# Patient Record
Sex: MALE | Race: WHITE | Employment: FULL TIME | URBAN - METROPOLITAN AREA
[De-identification: names, ages, dates, MRNs, and addresses within clinical notes are randomized per-mention and may not be internally consistent; named-entity substitution may affect disease eponyms.]

---

## 2021-08-12 ENCOUNTER — HOSPITAL ENCOUNTER (INPATIENT)
Age: 66
LOS: 3 days | Discharge: HOME | DRG: 871 | End: 2021-08-15
Attending: EMERGENCY MEDICINE | Admitting: INTERNAL MEDICINE
Payer: COMMERCIAL

## 2021-08-12 ENCOUNTER — APPOINTMENT (OUTPATIENT)
Dept: GENERAL RADIOLOGY | Age: 66
DRG: 871 | End: 2021-08-12
Attending: EMERGENCY MEDICINE
Payer: COMMERCIAL

## 2021-08-12 DIAGNOSIS — U07.1 PNEUMONIA DUE TO COVID-19 VIRUS: Primary | ICD-10-CM

## 2021-08-12 DIAGNOSIS — J12.82 PNEUMONIA DUE TO COVID-19 VIRUS: Primary | ICD-10-CM

## 2021-08-12 LAB
ALBUMIN SERPL-MCNC: 3.5 G/DL (ref 3.5–5)
ALBUMIN/GLOB SERPL: 0.7 {RATIO} (ref 1.1–2.2)
ALP SERPL-CCNC: 62 U/L (ref 45–117)
ALT SERPL-CCNC: 54 U/L (ref 12–78)
ANION GAP SERPL CALC-SCNC: 7 MMOL/L (ref 5–15)
APPEARANCE UR: ABNORMAL
AST SERPL-CCNC: 48 U/L (ref 15–37)
BACTERIA URNS QL MICRO: NEGATIVE /HPF
BASOPHILS # BLD: 0 K/UL (ref 0–0.1)
BASOPHILS NFR BLD: 0 % (ref 0–1)
BILIRUB SERPL-MCNC: 1 MG/DL (ref 0.2–1)
BILIRUB UR QL: NEGATIVE
BUN SERPL-MCNC: 11 MG/DL (ref 6–20)
BUN/CREAT SERPL: 9 (ref 12–20)
CALCIUM SERPL-MCNC: 9 MG/DL (ref 8.5–10.1)
CHLORIDE SERPL-SCNC: 99 MMOL/L (ref 97–108)
CO2 SERPL-SCNC: 28 MMOL/L (ref 21–32)
COLOR UR: ABNORMAL
CREAT SERPL-MCNC: 1.22 MG/DL (ref 0.7–1.3)
D DIMER PPP FEU-MCNC: 0.36 MG/L FEU (ref 0–0.65)
DIFFERENTIAL METHOD BLD: ABNORMAL
EOSINOPHIL # BLD: 0 K/UL (ref 0–0.4)
EOSINOPHIL NFR BLD: 0 % (ref 0–7)
EPITH CASTS URNS QL MICRO: ABNORMAL /LPF
ERYTHROCYTE [DISTWIDTH] IN BLOOD BY AUTOMATED COUNT: 13.6 % (ref 11.5–14.5)
GLOBULIN SER CALC-MCNC: 5.3 G/DL (ref 2–4)
GLUCOSE SERPL-MCNC: 138 MG/DL (ref 65–100)
GLUCOSE UR STRIP.AUTO-MCNC: NEGATIVE MG/DL
HCT VFR BLD AUTO: 52 % (ref 36.6–50.3)
HGB BLD-MCNC: 17.2 G/DL (ref 12.1–17)
HGB UR QL STRIP: NEGATIVE
HYALINE CASTS URNS QL MICRO: ABNORMAL /LPF (ref 0–5)
IMM GRANULOCYTES # BLD AUTO: 0 K/UL (ref 0–0.04)
IMM GRANULOCYTES NFR BLD AUTO: 0 % (ref 0–0.5)
KETONES UR QL STRIP.AUTO: 40 MG/DL
LEUKOCYTE ESTERASE UR QL STRIP.AUTO: NEGATIVE
LYMPHOCYTES # BLD: 0.7 K/UL (ref 0.8–3.5)
LYMPHOCYTES NFR BLD: 11 % (ref 12–49)
MCH RBC QN AUTO: 29.4 PG (ref 26–34)
MCHC RBC AUTO-ENTMCNC: 33.1 G/DL (ref 30–36.5)
MCV RBC AUTO: 88.9 FL (ref 80–99)
MONOCYTES # BLD: 0.2 K/UL (ref 0–1)
MONOCYTES NFR BLD: 4 % (ref 5–13)
NEUTS SEG # BLD: 5.3 K/UL (ref 1.8–8)
NEUTS SEG NFR BLD: 85 % (ref 32–75)
NITRITE UR QL STRIP.AUTO: NEGATIVE
NRBC # BLD: 0 K/UL (ref 0–0.01)
NRBC BLD-RTO: 0 PER 100 WBC
PH UR STRIP: 6 [PH] (ref 5–8)
PLATELET # BLD AUTO: 186 K/UL (ref 150–400)
PLATELET COMMENTS,PCOM: ABNORMAL
PMV BLD AUTO: 9.9 FL (ref 8.9–12.9)
POTASSIUM SERPL-SCNC: 3.3 MMOL/L (ref 3.5–5.1)
PROT SERPL-MCNC: 8.8 G/DL (ref 6.4–8.2)
PROT UR STRIP-MCNC: 30 MG/DL
RBC # BLD AUTO: 5.85 M/UL (ref 4.1–5.7)
RBC #/AREA URNS HPF: ABNORMAL /HPF (ref 0–5)
RBC MORPH BLD: ABNORMAL
SODIUM SERPL-SCNC: 134 MMOL/L (ref 136–145)
SP GR UR REFRACTOMETRY: 1.03 (ref 1–1.03)
UA: UC IF INDICATED,UAUC: ABNORMAL
UROBILINOGEN UR QL STRIP.AUTO: 0.2 EU/DL (ref 0.2–1)
WBC # BLD AUTO: 6.2 K/UL (ref 4.1–11.1)
WBC MORPH BLD: ABNORMAL
WBC URNS QL MICRO: ABNORMAL /HPF (ref 0–4)

## 2021-08-12 PROCEDURE — 74011250636 HC RX REV CODE- 250/636: Performed by: INTERNAL MEDICINE

## 2021-08-12 PROCEDURE — 65660000000 HC RM CCU STEPDOWN

## 2021-08-12 PROCEDURE — 80053 COMPREHEN METABOLIC PANEL: CPT

## 2021-08-12 PROCEDURE — 85025 COMPLETE CBC W/AUTO DIFF WBC: CPT

## 2021-08-12 PROCEDURE — 36415 COLL VENOUS BLD VENIPUNCTURE: CPT

## 2021-08-12 PROCEDURE — 71045 X-RAY EXAM CHEST 1 VIEW: CPT

## 2021-08-12 PROCEDURE — 99285 EMERGENCY DEPT VISIT HI MDM: CPT

## 2021-08-12 PROCEDURE — 74011000250 HC RX REV CODE- 250: Performed by: INTERNAL MEDICINE

## 2021-08-12 PROCEDURE — 74011250637 HC RX REV CODE- 250/637: Performed by: EMERGENCY MEDICINE

## 2021-08-12 PROCEDURE — 94640 AIRWAY INHALATION TREATMENT: CPT

## 2021-08-12 PROCEDURE — 74011250637 HC RX REV CODE- 250/637: Performed by: INTERNAL MEDICINE

## 2021-08-12 PROCEDURE — XW033E5 INTRODUCTION OF REMDESIVIR ANTI-INFECTIVE INTO PERIPHERAL VEIN, PERCUTANEOUS APPROACH, NEW TECHNOLOGY GROUP 5: ICD-10-PCS | Performed by: INTERNAL MEDICINE

## 2021-08-12 PROCEDURE — 74011250636 HC RX REV CODE- 250/636: Performed by: EMERGENCY MEDICINE

## 2021-08-12 PROCEDURE — 85379 FIBRIN DEGRADATION QUANT: CPT

## 2021-08-12 PROCEDURE — 81001 URINALYSIS AUTO W/SCOPE: CPT

## 2021-08-12 PROCEDURE — 96374 THER/PROPH/DIAG INJ IV PUSH: CPT

## 2021-08-12 RX ORDER — ONDANSETRON 4 MG/1
4 TABLET, ORALLY DISINTEGRATING ORAL
Status: DISCONTINUED | OUTPATIENT
Start: 2021-08-12 | End: 2021-08-15 | Stop reason: HOSPADM

## 2021-08-12 RX ORDER — DEXAMETHASONE SODIUM PHOSPHATE 4 MG/ML
10 INJECTION, SOLUTION INTRA-ARTICULAR; INTRALESIONAL; INTRAMUSCULAR; INTRAVENOUS; SOFT TISSUE ONCE
Status: COMPLETED | OUTPATIENT
Start: 2021-08-12 | End: 2021-08-12

## 2021-08-12 RX ORDER — ENOXAPARIN SODIUM 100 MG/ML
40 INJECTION SUBCUTANEOUS DAILY
Status: DISCONTINUED | OUTPATIENT
Start: 2021-08-13 | End: 2021-08-15 | Stop reason: HOSPADM

## 2021-08-12 RX ORDER — ALBUTEROL SULFATE 90 UG/1
2 AEROSOL, METERED RESPIRATORY (INHALATION)
Status: DISCONTINUED | OUTPATIENT
Start: 2021-08-12 | End: 2021-08-12

## 2021-08-12 RX ORDER — ALBUTEROL SULFATE 0.83 MG/ML
2.5 SOLUTION RESPIRATORY (INHALATION)
Status: DISCONTINUED | OUTPATIENT
Start: 2021-08-12 | End: 2021-08-13

## 2021-08-12 RX ORDER — DEXAMETHASONE SODIUM PHOSPHATE 4 MG/ML
6 INJECTION, SOLUTION INTRA-ARTICULAR; INTRALESIONAL; INTRAMUSCULAR; INTRAVENOUS; SOFT TISSUE EVERY 24 HOURS
Status: DISCONTINUED | OUTPATIENT
Start: 2021-08-13 | End: 2021-08-15 | Stop reason: HOSPADM

## 2021-08-12 RX ORDER — SODIUM CHLORIDE 0.9 % (FLUSH) 0.9 %
5-40 SYRINGE (ML) INJECTION AS NEEDED
Status: DISCONTINUED | OUTPATIENT
Start: 2021-08-12 | End: 2021-08-15 | Stop reason: HOSPADM

## 2021-08-12 RX ORDER — CODEINE PHOSPHATE AND GUAIFENESIN 10; 100 MG/5ML; MG/5ML
10 SOLUTION ORAL
Status: COMPLETED | OUTPATIENT
Start: 2021-08-12 | End: 2021-08-12

## 2021-08-12 RX ORDER — ACETAMINOPHEN 325 MG/1
650 TABLET ORAL
Status: DISCONTINUED | OUTPATIENT
Start: 2021-08-12 | End: 2021-08-15 | Stop reason: HOSPADM

## 2021-08-12 RX ORDER — ONDANSETRON 2 MG/ML
4 INJECTION INTRAMUSCULAR; INTRAVENOUS
Status: DISCONTINUED | OUTPATIENT
Start: 2021-08-12 | End: 2021-08-15 | Stop reason: HOSPADM

## 2021-08-12 RX ORDER — SODIUM CHLORIDE 0.9 % (FLUSH) 0.9 %
5-40 SYRINGE (ML) INJECTION EVERY 8 HOURS
Status: DISCONTINUED | OUTPATIENT
Start: 2021-08-12 | End: 2021-08-15 | Stop reason: HOSPADM

## 2021-08-12 RX ORDER — POLYETHYLENE GLYCOL 3350 17 G/17G
17 POWDER, FOR SOLUTION ORAL DAILY PRN
Status: DISCONTINUED | OUTPATIENT
Start: 2021-08-12 | End: 2021-08-15 | Stop reason: HOSPADM

## 2021-08-12 RX ORDER — POTASSIUM CHLORIDE 750 MG/1
40 TABLET, FILM COATED, EXTENDED RELEASE ORAL
Status: COMPLETED | OUTPATIENT
Start: 2021-08-12 | End: 2021-08-12

## 2021-08-12 RX ORDER — ALBUTEROL SULFATE 90 UG/1
2 AEROSOL, METERED RESPIRATORY (INHALATION)
Status: COMPLETED | OUTPATIENT
Start: 2021-08-12 | End: 2021-08-12

## 2021-08-12 RX ADMIN — AZITHROMYCIN MONOHYDRATE 500 MG: 500 INJECTION, POWDER, LYOPHILIZED, FOR SOLUTION INTRAVENOUS at 22:21

## 2021-08-12 RX ADMIN — SODIUM CHLORIDE 1000 ML: 9 INJECTION, SOLUTION INTRAVENOUS at 18:23

## 2021-08-12 RX ADMIN — POTASSIUM CHLORIDE 40 MEQ: 750 TABLET, FILM COATED, EXTENDED RELEASE ORAL at 22:54

## 2021-08-12 RX ADMIN — ALBUTEROL SULFATE 2.5 MG: 2.5 SOLUTION RESPIRATORY (INHALATION) at 22:56

## 2021-08-12 RX ADMIN — GUAIFENESIN AND CODEINE PHOSPHATE 10 ML: 100; 10 SOLUTION ORAL at 20:39

## 2021-08-12 RX ADMIN — DEXAMETHASONE SODIUM PHOSPHATE 10 MG: 4 INJECTION, SOLUTION INTRAMUSCULAR; INTRAVENOUS at 20:39

## 2021-08-12 RX ADMIN — ONDANSETRON 4 MG: 2 INJECTION INTRAMUSCULAR; INTRAVENOUS at 22:34

## 2021-08-12 RX ADMIN — ALBUTEROL SULFATE 2 PUFF: 90 AEROSOL, METERED RESPIRATORY (INHALATION) at 21:21

## 2021-08-12 NOTE — ED PROVIDER NOTES
EMERGENCY DEPARTMENT HISTORY AND PHYSICAL EXAM      Date: 8/12/2021  Patient Name: Kira Mobley  Patient Age and Sex: 78 y.o. male    History of Presenting Illness     Chief Complaint   Patient presents with    Positive For Covid-19     Pt c/o fever, generalized body aches and feeling weak. Tested Covid + as of today. Also c/o diarrhea and feeling dehydrated       History Provided By: Patient    Ability to gather history was limited by:     HPI: Kira Mobley, 78 y.o. male with no significant past medical history, who is not vaccinated for COVID-19, complains of generalized fatigue and weakness, poor appetite, shortness of breath, frequent cough,  In the setting of testing positive for COVID-19 yesterday. Symptoms have been present for 3 or 4 days. Subjective fevers. Symptoms are moderate severity, gradually worsening. Location:    Quality:      Severity:    Duration:   Timing:      Context:    Modifying factors:   Associated symptoms:     Past History      The patient's medical, surgical, and social history on file were reviewed by me today.  The family history was reviewed by me today and was non-contributory, unless otherwise specified below:    Past Medical History:  No past medical history on file. Past Surgical History:  No past surgical history on file. Family History:  No family history on file. Social History:  Social History     Tobacco Use    Smoking status: Not on file   Substance Use Topics    Alcohol use: Not on file    Drug use: Not on file       Current Medications:  No current facility-administered medications on file prior to encounter. No current outpatient medications on file prior to encounter. Allergies:   Allergies   Allergen Reactions    Pcn [Penicillins] Hives     Review of Systems    A complete ROS was reviewed by me today and was negative, unless otherwise specified below:    Review of Systems   Constitutional: Positive for appetite change, diaphoresis, fatigue and fever. Respiratory: Positive for cough and shortness of breath. Cardiovascular: Negative for chest pain. Gastrointestinal: Negative for abdominal pain. Musculoskeletal: Positive for myalgias. All other systems reviewed and are negative. Physical Exam   Vital Signs  Patient Vitals for the past 8 hrs:   Temp Pulse Resp BP SpO2   08/12/21 1829 100.2 °F (37.9 °C) (!) 110 22 (!) 149/78 92 %   08/12/21 1800 -- -- -- -- 96 %   08/12/21 1513 98.7 °F (37.1 °C) (!) 116 20 123/87 96 %          Physical Exam  Vitals and nursing note reviewed. Constitutional:       General: He is not in acute distress. Appearance: Normal appearance. He is well-developed. He is not ill-appearing. HENT:      Head: Normocephalic and atraumatic. Eyes:      General:         Right eye: No discharge. Left eye: No discharge. Conjunctiva/sclera: Conjunctivae normal.   Cardiovascular:      Rate and Rhythm: Normal rate and regular rhythm. Heart sounds: Normal heart sounds. No murmur heard. Pulmonary:      Effort: Pulmonary effort is normal. No respiratory distress. Breath sounds: Wheezing ( Trace wheezing bilaterally) present. Abdominal:      General: There is no distension. Palpations: Abdomen is soft. Tenderness: There is no abdominal tenderness. Musculoskeletal:         General: No deformity. Normal range of motion. Cervical back: Normal range of motion and neck supple. Skin:     General: Skin is warm and dry. Findings: No rash. Neurological:      General: No focal deficit present. Mental Status: He is alert and oriented to person, place, and time. Psychiatric:         Mood and Affect: Mood normal.         Behavior: Behavior normal.         Thought Content:  Thought content normal.         Diagnostic Study Results   Labs  Recent Results (from the past 24 hour(s))   CBC WITH AUTOMATED DIFF    Collection Time: 08/12/21  3:17 PM   Result Value Ref Range    WBC 6.2 4.1 - 11.1 K/uL    RBC 5.85 (H) 4.10 - 5.70 M/uL    HGB 17.2 (H) 12.1 - 17.0 g/dL    HCT 52.0 (H) 36.6 - 50.3 %    MCV 88.9 80.0 - 99.0 FL    MCH 29.4 26.0 - 34.0 PG    MCHC 33.1 30.0 - 36.5 g/dL    RDW 13.6 11.5 - 14.5 %    PLATELET 804 703 - 148 K/uL    MPV 9.9 8.9 - 12.9 FL    NRBC 0.0 0  WBC    ABSOLUTE NRBC 0.00 0.00 - 0.01 K/uL    NEUTROPHILS 85 (H) 32 - 75 %    LYMPHOCYTES 11 (L) 12 - 49 %    MONOCYTES 4 (L) 5 - 13 %    EOSINOPHILS 0 0 - 7 %    BASOPHILS 0 0 - 1 %    IMMATURE GRANULOCYTES 0 0.0 - 0.5 %    ABS. NEUTROPHILS 5.3 1.8 - 8.0 K/UL    ABS. LYMPHOCYTES 0.7 (L) 0.8 - 3.5 K/UL    ABS. MONOCYTES 0.2 0.0 - 1.0 K/UL    ABS. EOSINOPHILS 0.0 0.0 - 0.4 K/UL    ABS. BASOPHILS 0.0 0.0 - 0.1 K/UL    ABS. IMM. GRANS. 0.0 0.00 - 0.04 K/UL    DF MANUAL      PLATELET COMMENTS Large Platelets      RBC COMMENTS ANISOCYTOSIS  1+        WBC COMMENTS SMUDGE CELLS     METABOLIC PANEL, COMPREHENSIVE    Collection Time: 08/12/21  3:17 PM   Result Value Ref Range    Sodium 134 (L) 136 - 145 mmol/L    Potassium 3.3 (L) 3.5 - 5.1 mmol/L    Chloride 99 97 - 108 mmol/L    CO2 28 21 - 32 mmol/L    Anion gap 7 5 - 15 mmol/L    Glucose 138 (H) 65 - 100 mg/dL    BUN 11 6 - 20 MG/DL    Creatinine 1.22 0.70 - 1.30 MG/DL    BUN/Creatinine ratio 9 (L) 12 - 20      GFR est AA >60 >60 ml/min/1.73m2    GFR est non-AA 59 (L) >60 ml/min/1.73m2    Calcium 9.0 8.5 - 10.1 MG/DL    Bilirubin, total 1.0 0.2 - 1.0 MG/DL    ALT (SGPT) 54 12 - 78 U/L    AST (SGOT) 48 (H) 15 - 37 U/L    Alk.  phosphatase 62 45 - 117 U/L    Protein, total 8.8 (H) 6.4 - 8.2 g/dL    Albumin 3.5 3.5 - 5.0 g/dL    Globulin 5.3 (H) 2.0 - 4.0 g/dL    A-G Ratio 0.7 (L) 1.1 - 2.2     URINALYSIS W/ REFLEX CULTURE    Collection Time: 08/12/21  3:17 PM    Specimen: Urine   Result Value Ref Range    Color DARK YELLOW      Appearance TURBID (A) CLEAR      Specific gravity 1.029 1.003 - 1.030      pH (UA) 6.0 5.0 - 8.0      Protein 30 (A) NEG mg/dL Glucose Negative NEG mg/dL    Ketone 40 (A) NEG mg/dL    Bilirubin Negative NEG      Blood Negative NEG      Urobilinogen 0.2 0.2 - 1.0 EU/dL    Nitrites Negative NEG      Leukocyte Esterase Negative NEG      WBC 0-4 0 - 4 /hpf    RBC 0-5 0 - 5 /hpf    Epithelial cells FEW FEW /lpf    Bacteria Negative NEG /hpf    UA:UC IF INDICATED CULTURE NOT INDICATED BY UA RESULT CNI      Hyaline cast 2-5 0 - 5 /lpf       Radiologic Studies  XR CHEST PORT   Final Result   Patchy peripheral airspace disease throughout both lungs, left   greater than right, consistent with COVID 19 pneumonia. CT Results  (Last 48 hours)    None        CXR Results  (Last 48 hours)               08/12/21 1820  XR CHEST PORT Final result    Impression:  Patchy peripheral airspace disease throughout both lungs, left   greater than right, consistent with COVID 19 pneumonia. Narrative:  INDICATION:  COVID +        FINDINGS: Single AP portable view of the chest obtained at 1803 demonstrates a   normal cardiomediastinal silhouette. The lungs are hypoinspiratory. There is   patchy airspace disease throughout the left lung. Similar findings on the right,   less severe. No osseous abnormalities are seen. Billable Procedures   Procedures    Cardiac monitoring was not ordered for this patient. Medical Decision Making     I reviewed the patient's most recent Emergency Dept notes and diagnostic tests in formulating my MDM on today's visit. Provider Notes (Medical Decision Making):   26-year-old male with generalized fatigue and shortness of breath and body aches and cough and feeling dehydrated and weak, in the setting of COVID-19 for the past few days. Unvaccinated. Well-appearing, borderline hypoxia. Patient ultimately developed room air oxygen saturation of 87% to 89% on room air, started on 2 L nasal cannula. He did not feel comfortable discharging home. Bilateral hazy infiltrates by chest x-ray.   Started on ceftriaxone and azithromycin. He does not have evidence of sepsis. Treated with dexamethasone, albuterol, fluids, stable for admission to floor. Carlita Wilson MD  6:52 PM  8/12/2021     Consults:    Social History     Tobacco Use    Smoking status: Not on file   Substance Use Topics    Alcohol use: Not on file    Drug use: Not on file       Medications Administered during ED course:  Medications   dexamethasone (DECADRON) 4 mg/mL injection 10 mg (has no administration in time range)   guaiFENesin-codeine (ROBITUSSIN AC) 100-10 mg/5 mL solution 10 mL (has no administration in time range)   albuterol (PROVENTIL HFA, VENTOLIN HFA, PROAIR HFA) inhaler 2 Puff (has no administration in time range)   sodium chloride 0.9 % bolus infusion 1,000 mL (1,000 mL IntraVENous New Bag 8/12/21 1219)          Prescriptions from today's ED visit:  There are no discharge medications for this patient. Diagnosis and Disposition     Disposition:      Clinical Impression: No diagnosis found. Attestation:  I personally performed the services described in this documentation on this date 8/12/2021 for patient Aelxey Gipson. Carlita Wilson MD        I was the first provider for this patient on this visit. To the best of my ability I reviewed relevant prior medical records, electrocardiograms, laboratories, and radiologic studies. The patient's presenting problems were discussed, and the patient was in agreement with the care plan formulated and outlined with them. Carlita Wilson MD    Please note that this dictation was completed with Dragon voice recognition software. Quite often unanticipated grammatical, syntax, homophones, and other interpretive errors are inadvertently transcribed by the computer software. Please disregard these errors and excuse any errors that have escaped final proofreading.

## 2021-08-12 NOTE — ED NOTES
Bedside shift change report given to Medina  (oncoming nurse) by Louann Salinas (offgoing nurse). Report included the following information SBAR, Kardex, ED Summary, [facility] and Recent Results.

## 2021-08-12 NOTE — ED TRIAGE NOTES
Pt c/o fever, generalized body aches and feeling weak. Tested Covid + as of today.   Also c/o diarrhea and feeling dehydrated

## 2021-08-13 LAB
ALBUMIN SERPL-MCNC: 3 G/DL (ref 3.5–5)
ALBUMIN/GLOB SERPL: 0.7 {RATIO} (ref 1.1–2.2)
ALP SERPL-CCNC: 53 U/L (ref 45–117)
ALT SERPL-CCNC: 46 U/L (ref 12–78)
ANION GAP SERPL CALC-SCNC: 7 MMOL/L (ref 5–15)
AST SERPL-CCNC: 38 U/L (ref 15–37)
BILIRUB SERPL-MCNC: 0.9 MG/DL (ref 0.2–1)
BUN SERPL-MCNC: 15 MG/DL (ref 6–20)
BUN/CREAT SERPL: 14 (ref 12–20)
CALCIUM SERPL-MCNC: 8.9 MG/DL (ref 8.5–10.1)
CHLORIDE SERPL-SCNC: 104 MMOL/L (ref 97–108)
CO2 SERPL-SCNC: 27 MMOL/L (ref 21–32)
CREAT SERPL-MCNC: 1.08 MG/DL (ref 0.7–1.3)
CRP SERPL-MCNC: 5.84 MG/DL (ref 0–0.6)
D DIMER PPP FEU-MCNC: 0.34 MG/L FEU (ref 0–0.65)
ERYTHROCYTE [DISTWIDTH] IN BLOOD BY AUTOMATED COUNT: 13.4 % (ref 11.5–14.5)
FERRITIN SERPL-MCNC: 2188 NG/ML (ref 26–388)
GLOBULIN SER CALC-MCNC: 4.5 G/DL (ref 2–4)
GLUCOSE BLD STRIP.AUTO-MCNC: 157 MG/DL (ref 65–117)
GLUCOSE SERPL-MCNC: 188 MG/DL (ref 65–100)
HCT VFR BLD AUTO: 47.1 % (ref 36.6–50.3)
HGB BLD-MCNC: 16 G/DL (ref 12.1–17)
MCH RBC QN AUTO: 30 PG (ref 26–34)
MCHC RBC AUTO-ENTMCNC: 34 G/DL (ref 30–36.5)
MCV RBC AUTO: 88.4 FL (ref 80–99)
NRBC # BLD: 0 K/UL (ref 0–0.01)
NRBC BLD-RTO: 0 PER 100 WBC
PLATELET # BLD AUTO: 185 K/UL (ref 150–400)
PMV BLD AUTO: 10.1 FL (ref 8.9–12.9)
POTASSIUM SERPL-SCNC: 3.7 MMOL/L (ref 3.5–5.1)
PROCALCITONIN SERPL-MCNC: <0.05 NG/ML
PROT SERPL-MCNC: 7.5 G/DL (ref 6.4–8.2)
RBC # BLD AUTO: 5.33 M/UL (ref 4.1–5.7)
SERVICE CMNT-IMP: ABNORMAL
SODIUM SERPL-SCNC: 138 MMOL/L (ref 136–145)
WBC # BLD AUTO: 3.1 K/UL (ref 4.1–11.1)

## 2021-08-13 PROCEDURE — 82962 GLUCOSE BLOOD TEST: CPT

## 2021-08-13 PROCEDURE — 94760 N-INVAS EAR/PLS OXIMETRY 1: CPT

## 2021-08-13 PROCEDURE — 82728 ASSAY OF FERRITIN: CPT

## 2021-08-13 PROCEDURE — 74011000250 HC RX REV CODE- 250: Performed by: INTERNAL MEDICINE

## 2021-08-13 PROCEDURE — 74011250637 HC RX REV CODE- 250/637: Performed by: INTERNAL MEDICINE

## 2021-08-13 PROCEDURE — 94640 AIRWAY INHALATION TREATMENT: CPT

## 2021-08-13 PROCEDURE — 74011000258 HC RX REV CODE- 258: Performed by: INTERNAL MEDICINE

## 2021-08-13 PROCEDURE — 86140 C-REACTIVE PROTEIN: CPT

## 2021-08-13 PROCEDURE — 36415 COLL VENOUS BLD VENIPUNCTURE: CPT

## 2021-08-13 PROCEDURE — 80053 COMPREHEN METABOLIC PANEL: CPT

## 2021-08-13 PROCEDURE — 74011250636 HC RX REV CODE- 250/636: Performed by: INTERNAL MEDICINE

## 2021-08-13 PROCEDURE — 85379 FIBRIN DEGRADATION QUANT: CPT

## 2021-08-13 PROCEDURE — 65660000000 HC RM CCU STEPDOWN

## 2021-08-13 PROCEDURE — 77010033678 HC OXYGEN DAILY

## 2021-08-13 PROCEDURE — 84145 PROCALCITONIN (PCT): CPT

## 2021-08-13 PROCEDURE — 85027 COMPLETE CBC AUTOMATED: CPT

## 2021-08-13 RX ORDER — ALBUTEROL SULFATE 90 UG/1
2 AEROSOL, METERED RESPIRATORY (INHALATION)
Status: DISCONTINUED | OUTPATIENT
Start: 2021-08-13 | End: 2021-08-15 | Stop reason: HOSPADM

## 2021-08-13 RX ORDER — DEXTROSE 50 % IN WATER (D50W) INTRAVENOUS SYRINGE
25-50 AS NEEDED
Status: DISCONTINUED | OUTPATIENT
Start: 2021-08-13 | End: 2021-08-15 | Stop reason: HOSPADM

## 2021-08-13 RX ORDER — INSULIN LISPRO 100 [IU]/ML
INJECTION, SOLUTION INTRAVENOUS; SUBCUTANEOUS
Status: DISCONTINUED | OUTPATIENT
Start: 2021-08-13 | End: 2021-08-15 | Stop reason: HOSPADM

## 2021-08-13 RX ORDER — MAGNESIUM SULFATE 100 %
4 CRYSTALS MISCELLANEOUS AS NEEDED
Status: DISCONTINUED | OUTPATIENT
Start: 2021-08-13 | End: 2021-08-15 | Stop reason: HOSPADM

## 2021-08-13 RX ADMIN — ENOXAPARIN SODIUM 40 MG: 40 INJECTION SUBCUTANEOUS at 08:28

## 2021-08-13 RX ADMIN — REMDESIVIR 200 MG: 100 INJECTION, POWDER, LYOPHILIZED, FOR SOLUTION INTRAVENOUS at 10:59

## 2021-08-13 RX ADMIN — ALBUTEROL SULFATE 2 PUFF: 90 AEROSOL, METERED RESPIRATORY (INHALATION) at 20:18

## 2021-08-13 RX ADMIN — Medication 10 ML: at 22:08

## 2021-08-13 RX ADMIN — CEFTRIAXONE 1 G: 1 INJECTION, POWDER, FOR SOLUTION INTRAMUSCULAR; INTRAVENOUS at 23:20

## 2021-08-13 RX ADMIN — Medication 10 ML: at 06:22

## 2021-08-13 RX ADMIN — AZITHROMYCIN MONOHYDRATE 500 MG: 500 INJECTION, POWDER, LYOPHILIZED, FOR SOLUTION INTRAVENOUS at 22:00

## 2021-08-13 RX ADMIN — ALBUTEROL SULFATE 2 PUFF: 90 AEROSOL, METERED RESPIRATORY (INHALATION) at 03:48

## 2021-08-13 RX ADMIN — DEXAMETHASONE SODIUM PHOSPHATE 6 MG: 4 INJECTION, SOLUTION INTRAMUSCULAR; INTRAVENOUS at 22:00

## 2021-08-13 RX ADMIN — ALBUTEROL SULFATE 2 PUFF: 90 AEROSOL, METERED RESPIRATORY (INHALATION) at 15:51

## 2021-08-13 RX ADMIN — Medication 10 ML: at 14:00

## 2021-08-13 NOTE — PROGRESS NOTES
Pharmacy Medication Reconciliation     The patient was interviewed regarding current PTA medication list, use and drug allergies. The patient was questioned regarding use of any other inhalers, topical products, over the counter medications, herbal medications, vitamin products or ophthalmic/nasal/otic medication use. Allergy Update: Pcn [penicillins]    Recommendations/Findings: The patient is not taking any prescription nor OTC medications. I asked about montelukast which was on Rx query, which the patient denied taking. Clarified PTA med list with the patient.  PTA medication list was corrected to the following:     None        Thank you,  Hayden Long

## 2021-08-13 NOTE — H&P
Hospitalist Admission Note    NAME: Lillie Yu   :  1955   MRN:  [id number]     Date/Time:  2021 11:37 PM    Patient PCP: Kari Malave MD  ________________________________________________________________________    My assessment of this patient's clinical condition and my plan of care is as follows. Assessment / Plan:  Covid 19 Pneumonia  Acute respiratory failure with hypoxia  -start decadron, remdesivir. Check inflammatory markers daily. Check CRP in am  -Cont o2 by nasal cannula   -check pro calcitonin level. Cont ceftriaxone and Zithromax and stop if procal level is normal.     Hypokalemia  -replaced and recheck in am       Code Status: full   Surrogate Decision Maker: wife luciana    DVT Prophylaxis: Lovenox  GI Prophylaxis: not indicated    Baseline: From home independent         Subjective:   CHIEF COMPLAINT: Sob    HISTORY OF PRESENT ILLNESS:     Lillie Yu is a 78 y.o.  male who presents with sob and cough since last 3 days. Sob is worse with minimal exertion with cough but no phlegm. He also reports having fever of 101. Does not report any chest pain. No abdominal pain N/V. On arrival to ed he was hypoxic and required 2L nasal cannula. We were asked to admit for work up and evaluation of the above problems. Pmh  None    No past surgical history on file. Social History     Tobacco Use    Smoking status: Not on file   Substance Use Topics    Alcohol use: Not on file        Famiy History  No cad in the family   Allergies   Allergen Reactions    Pcn [Penicillins] Hives        Prior to Admission medications    Not on File       REVIEW OF SYSTEMS:     I am not able to complete the review of systems because:    The patient is intubated and sedated    The patient has altered mental status due to his acute medical problems    The patient has baseline aphasia from prior stroke(s)    The patient has baseline dementia and is not reliable historian    The patient is in acute medical distress and unable to provide information           Total of 12 systems reviewed as follows:       POSITIVE= underlined text  Negative = text not underlined  General:  fever, chills, sweats, generalized weakness, weight loss/gain,      loss of appetite   Eyes:    blurred vision, eye pain, loss of vision, double vision  ENT:    rhinorrhea, pharyngitis   Respiratory:   cough, sputum production, SOB, SMITH, wheezing, pleuritic pain   Cardiology:   chest pain, palpitations, orthopnea, PND, edema, syncope   Gastrointestinal:  abdominal pain , N/V, diarrhea, dysphagia, constipation, bleeding   Genitourinary:  frequency, urgency, dysuria, hematuria, incontinence   Muskuloskeletal :  arthralgia, myalgia, back pain  Hematology:  easy bruising, nose or gum bleeding, lymphadenopathy   Dermatological: rash, ulceration, pruritis, color change / jaundice  Endocrine:   hot flashes or polydipsia   Neurological:  headache, dizziness, confusion, focal weakness, paresthesia,     Speech difficulties, memory loss, gait difficulty  Psychological: Feelings of anxiety, depression, agitation    Objective:   VITALS:    Visit Vitals  /84   Pulse (!) 101   Temp 99 °F (37.2 °C)   Resp 26   Ht 6' 2\" (1.88 m)   Wt 102.5 kg (225 lb 15.5 oz)   SpO2 93%   BMI 29.01 kg/m²       PHYSICAL EXAM:    General:    Alert, cooperative, no distress, appears stated age. HEENT: Atraumatic, anicteric sclerae, pink conjunctivae     No oral ulcers, mucosa moist, throat clear, dentition fair  Neck:  Supple, symmetrical,  thyroid: non tender  Lungs:   Crackles +, no wheeze   Chest wall:  No tenderness  No Accessory muscle use. Heart:   Regular  rhythm,  No  murmur   No edema  Abdomen:   Soft, non-tender. Not distended. Bowel sounds normal  Extremities: No cyanosis. No clubbing,      Skin turgor normal, Capillary refill normal, Radial dial pulse 2+  Skin:     Not pale.   Not Jaundiced  No rashes   Psych:  Not anxious or agitated. Neurologic: EOMs intact. No facial asymmetry. No aphasia or slurred speech. Symmetrical strength, Sensation grossly intact. Alert and oriented X 4.     _______________________________________________________________________  Care Plan discussed with:    Comments   Patient y    Family      RN y    Care Manager                    Consultant:      _______________________________________________________________________  Expected  Disposition:   Home with Family y   HH/PT/OT/RN    SNF/LTC    REJI    ________________________________________________________________________  TOTAL TIME:  61 Minutes    Critical Care Provided     Minutes non procedure based      Comments    y Reviewed previous records   >50% of visit spent in counseling and coordination of care y Discussion with patient and/or family and questions answered       ________________________________________________________________________  Signed: Clint Preston MD    Procedures: see electronic medical records for all procedures/Xrays and details which were not copied into this note but were reviewed prior to creation of Plan. LAB DATA REVIEWED:    Recent Results (from the past 24 hour(s))   CBC WITH AUTOMATED DIFF    Collection Time: 08/12/21  3:17 PM   Result Value Ref Range    WBC 6.2 4.1 - 11.1 K/uL    RBC 5.85 (H) 4.10 - 5.70 M/uL    HGB 17.2 (H) 12.1 - 17.0 g/dL    HCT 52.0 (H) 36.6 - 50.3 %    MCV 88.9 80.0 - 99.0 FL    MCH 29.4 26.0 - 34.0 PG    MCHC 33.1 30.0 - 36.5 g/dL    RDW 13.6 11.5 - 14.5 %    PLATELET 485 959 - 239 K/uL    MPV 9.9 8.9 - 12.9 FL    NRBC 0.0 0  WBC    ABSOLUTE NRBC 0.00 0.00 - 0.01 K/uL    NEUTROPHILS 85 (H) 32 - 75 %    LYMPHOCYTES 11 (L) 12 - 49 %    MONOCYTES 4 (L) 5 - 13 %    EOSINOPHILS 0 0 - 7 %    BASOPHILS 0 0 - 1 %    IMMATURE GRANULOCYTES 0 0.0 - 0.5 %    ABS. NEUTROPHILS 5.3 1.8 - 8.0 K/UL    ABS. LYMPHOCYTES 0.7 (L) 0.8 - 3.5 K/UL    ABS. MONOCYTES 0.2 0.0 - 1.0 K/UL    ABS.  EOSINOPHILS 0.0 0.0 - 0.4 K/UL    ABS. BASOPHILS 0.0 0.0 - 0.1 K/UL    ABS. IMM. GRANS. 0.0 0.00 - 0.04 K/UL    DF MANUAL      PLATELET COMMENTS Large Platelets      RBC COMMENTS ANISOCYTOSIS  1+        WBC COMMENTS SMUDGE CELLS     METABOLIC PANEL, COMPREHENSIVE    Collection Time: 08/12/21  3:17 PM   Result Value Ref Range    Sodium 134 (L) 136 - 145 mmol/L    Potassium 3.3 (L) 3.5 - 5.1 mmol/L    Chloride 99 97 - 108 mmol/L    CO2 28 21 - 32 mmol/L    Anion gap 7 5 - 15 mmol/L    Glucose 138 (H) 65 - 100 mg/dL    BUN 11 6 - 20 MG/DL    Creatinine 1.22 0.70 - 1.30 MG/DL    BUN/Creatinine ratio 9 (L) 12 - 20      GFR est AA >60 >60 ml/min/1.73m2    GFR est non-AA 59 (L) >60 ml/min/1.73m2    Calcium 9.0 8.5 - 10.1 MG/DL    Bilirubin, total 1.0 0.2 - 1.0 MG/DL    ALT (SGPT) 54 12 - 78 U/L    AST (SGOT) 48 (H) 15 - 37 U/L    Alk.  phosphatase 62 45 - 117 U/L    Protein, total 8.8 (H) 6.4 - 8.2 g/dL    Albumin 3.5 3.5 - 5.0 g/dL    Globulin 5.3 (H) 2.0 - 4.0 g/dL    A-G Ratio 0.7 (L) 1.1 - 2.2     URINALYSIS W/ REFLEX CULTURE    Collection Time: 08/12/21  3:17 PM    Specimen: Urine   Result Value Ref Range    Color DARK YELLOW      Appearance TURBID (A) CLEAR      Specific gravity 1.029 1.003 - 1.030      pH (UA) 6.0 5.0 - 8.0      Protein 30 (A) NEG mg/dL    Glucose Negative NEG mg/dL    Ketone 40 (A) NEG mg/dL    Bilirubin Negative NEG      Blood Negative NEG      Urobilinogen 0.2 0.2 - 1.0 EU/dL    Nitrites Negative NEG      Leukocyte Esterase Negative NEG      WBC 0-4 0 - 4 /hpf    RBC 0-5 0 - 5 /hpf    Epithelial cells FEW FEW /lpf    Bacteria Negative NEG /hpf    UA:UC IF INDICATED CULTURE NOT INDICATED BY UA RESULT CNI      Hyaline cast 2-5 0 - 5 /lpf   D DIMER    Collection Time: 08/12/21  8:15 PM   Result Value Ref Range    D-dimer 0.36 0.00 - 0.65 mg/L FEU

## 2021-08-13 NOTE — PROGRESS NOTES
Hospitalist Progress Note    NAME: Jocelyn Winter   :  1955   MRN:  [id number]     Admit date: 2021    Today's date: 21    PCP: MD Pato Byrne M.D. Cell [phone]    Anticipated discharge date:    Barriers:      Assessment / Plan:    Covid 19 Pneumonia POA  Acute respiratory failure with hypoxia POA  Sepsis POA RR 27,   - Several days of cough and SOB  - CXR Patchy peripheral airspace disease throughout both lungs, left              greater than right, consistent with COVID 19 pneumonia. - Day 2 decadron, remdesivir. Inflammatory markers ferritin 2188, CRP 5.84  -Cont o2 by nasal cannula 2 liters  - Procalcitonin level < 0.05     Stop ceftriaxone and Zithromax   - Ambulate  - Lovenox    Hyperglycemia on steroids POA  - BS to 188  - SSI  - Check HgBa1c     Hypokalemia  -replaced and improved    Overweight POA Body mass index is 29.01 kg/m². Code Status: full   Surrogate Decision Maker: wife luciana     DVT Prophylaxis: Lovenox  GI Prophylaxis: not indicated     Baseline: From home independent     Body mass index is 29.01 kg/m². Subjective:     Chief Complaint / Reason for Physician Visit f/u COVID-19 pneumonia  \"Still coughing\". Discussed with RN events overnight. Currently on 2 liters NC, breathing comfortable  Still with cough  No N/V or diarrhea    Review of Systems:  Symptom Y/N Comments  Symptom Y/N Comments   Fever/Chills n   Chest Pain n    Poor Appetite    Edema     Cough y   Abdominal Pain n    Sputum    Joint Pain     SOB/SMITH y   Headache     Nausea/vomit n   Tolerating PT/OT     Diarrhea n   Tolerating Diet y    Constipation    Other       Could NOT obtain due to:      Objective:     VITALS:   Last 24hrs VS reviewed since prior progress note.  Most recent are:  Patient Vitals for the past 24 hrs:   Temp Pulse Resp BP SpO2   21 1552 -- -- -- -- 96 %   21 1516 98 °F (36.7 °C) 83 18 (!) 145/74 93 %   21 1135 97.3 °F (36.3 °C) 84 18 138/69 92 %   08/13/21 0751 97.8 °F (36.6 °C) 83 18 101/61 93 %   08/13/21 0634 97.9 °F (36.6 °C) 84 16 136/62 94 %   08/13/21 0530 97.7 °F (36.5 °C) 74 15 132/67 94 %   08/13/21 0348 -- -- -- -- 95 %   08/13/21 0128 -- -- -- -- 95 %   08/12/21 2200 99 °F (37.2 °C) (!) 101 26 139/84 93 %   08/12/21 2130 -- 100 15 137/80 (!) 88 %   08/12/21 2030 -- (!) 101 27 132/71 90 %   08/12/21 1900 99.1 °F (37.3 °C) 97 27 (!) 141/73 92 %   08/12/21 1829 100.2 °F (37.9 °C) (!) 110 22 (!) 149/78 92 %   08/12/21 1800 -- -- -- -- 96 %     No intake or output data in the 24 hours ending 08/13/21 1643     Wt Readings from Last 12 Encounters:   08/12/21 102.5 kg (225 lb 15.5 oz)       PHYSICAL EXAM:    I had a face to face encounter and independently examined this patient on 08/13/21 as outlined below:    General: WD, WN. Alert, cooperative, no acute distress    EENT:  PERRL. Anicteric sclerae. MMM  Resp:  Crackles at bases bilaterally, no wheezing. No accessory muscle use  CV:  Regular  rhythm,  No edema  GI:  Soft, Non distended, Non tender. +Bowel sounds, no rebound  Neurologic:  Alert and oriented X 3, normal speech, non focal motor exam  Psych:   Good insight. Not anxious nor agitated  Skin:  No rashes. No jaundice    Reviewed most current lab test results and cultures  YES  Reviewed most current radiology test results   YES  Review and summation of old records today    NO  Reviewed patient's current orders and MAR    YES  PMH/SH reviewed - no change compared to H&P  ________________________________________________________________________  Care Plan discussed with:    Comments   Patient x    Family      RN x    Care Manager     Consultant                        Multidiciplinary team rounds were held today with , nursing, pharmacist and clinical coordinator. Patient's plan of care was discussed; medications were reviewed and discharge planning was addressed. ________________________________________________________________________      Comments   >50% of visit spent in counseling and coordination of care     ________________________________________________________________________  Rama Bolivar MD     Procedures: see electronic medical records for all procedures/Xrays and details which were not copied into this note but were reviewed prior to creation of Plan. LABS:  I reviewed today's most current labs and imaging studies.   Pertinent labs include:  Recent Labs     08/13/21  0549 08/12/21  1517   WBC 3.1* 6.2   HGB 16.0 17.2*   HCT 47.1 52.0*    186     Recent Labs     08/13/21  0549 08/12/21  1517    134*   K 3.7 3.3*    99   CO2 27 28   * 138*   BUN 15 11   CREA 1.08 1.22   CA 8.9 9.0   ALB 3.0* 3.5   TBILI 0.9 1.0   ALT 46 54

## 2021-08-13 NOTE — PROGRESS NOTES
End of Shift Note    Bedside shift change report given to Breana (oncoming nurse) by Ne Quintanilla (offgoing nurse).   Report included the following information SBAR, Kardex, Intake/Output, MAR and Recent Results    Shift worked:  1900-0730     Shift summary and any significant changes:     Pt admitted to floor from ED     Concerns for physician to address:  none     Zone phone for oncoming shift:          Activity:     Number times ambulated in hallways past shift: 0  Number of times OOB to chair past shift: 0    Cardiac:   Cardiac Monitoring: Yes           Access:   Current line(s): PIV     Genitourinary:   Urinary status: voiding    Respiratory:   O2 Device: Nasal cannula  Chronic home O2 use?: NO  Incentive spirometer at bedside: YES     GI:  Last Bowel Movement Date: 08/13/21  Current diet:  ADULT DIET Regular; Low Fat/Low Chol/High Fiber/GUILLERMO  Passing flatus: YES  Tolerating current diet: YES       Pain Management:   Patient states pain is manageable on current regimen: N/A    Skin:     Interventions: increase time out of bed    Patient Safety:  Fall Score:    Interventions: gripper socks       Length of Stay:  Expected LOS: - - -  Actual LOS: 1      Ne Quintanilla

## 2021-08-13 NOTE — ED NOTES
Patient is being transferred to [address], Room # 1687. Report given to Salena Landin RN on [facility] for routine progression of care. Report consisted of the following information SBAR, ED Summary, Intake/Output and MAR. Patient transferred to receiving unit by: Tech (RN or tech name). Outstanding consults needed: No     Next labs due: No     The following personal items will be sent with the patient during transfer to the floor: All valuables:    Cardiac monitoring ordered: No     The following CURRENT information was reported to the receiving RN:    Code status: Full Code at time of transfer    Last set of vital signs:  Vital Signs  Level of Consciousness: Alert (0) (08/13/21 0530)  Temp: 99 °F (37.2 °C) (08/12/21 2200)  Temp Source: Oral (08/12/21 2200)  Pulse (Heart Rate): 74 (08/13/21 0530)  Resp Rate: 15 (08/13/21 0530)  BP: 132/67 (08/13/21 0530)  MAP (Monitor): 77 (08/13/21 0530)  MAP (Calculated): 89 (08/13/21 0530)  BP 1 Location: Left arm (08/12/21 1513)  BP 1 Method: Automatic (08/12/21 1513)  BP Patient Position: Sitting (08/12/21 1513)  MEWS Score: 3 (08/12/21 2200)         Oxygen Therapy  O2 Sat (%): 94 % (08/13/21 0530)  Pulse via Oximetry: 73 beats per minute (08/13/21 0530)  O2 Device: Nasal cannula (08/13/21 0530)  O2 Flow Rate (L/min): 2 l/min (08/13/21 0530)      Last pain assessment:  Pain 1  Pain Scale 1: Numeric (0 - 10)  Pain Intensity 1: 5      Wounds: No     Urinary catheter: voiding  Is there a moya order: No     LDAs:       Peripheral IV 08/12/21 Left Antecubital (Active)         Opportunity for questions and clarification was provided.     Deb Sargent RN

## 2021-08-14 LAB
ALBUMIN SERPL-MCNC: 2.9 G/DL (ref 3.5–5)
ALBUMIN/GLOB SERPL: 0.7 {RATIO} (ref 1.1–2.2)
ALP SERPL-CCNC: 49 U/L (ref 45–117)
ALT SERPL-CCNC: 51 U/L (ref 12–78)
ANION GAP SERPL CALC-SCNC: 7 MMOL/L (ref 5–15)
AST SERPL-CCNC: 43 U/L (ref 15–37)
BASOPHILS # BLD: 0 K/UL (ref 0–0.1)
BASOPHILS NFR BLD: 0 % (ref 0–1)
BILIRUB SERPL-MCNC: 0.6 MG/DL (ref 0.2–1)
BUN SERPL-MCNC: 19 MG/DL (ref 6–20)
BUN/CREAT SERPL: 21 (ref 12–20)
CALCIUM SERPL-MCNC: 8.7 MG/DL (ref 8.5–10.1)
CHLORIDE SERPL-SCNC: 106 MMOL/L (ref 97–108)
CO2 SERPL-SCNC: 25 MMOL/L (ref 21–32)
CREAT SERPL-MCNC: 0.92 MG/DL (ref 0.7–1.3)
CRP SERPL-MCNC: 2.37 MG/DL (ref 0–0.6)
D DIMER PPP FEU-MCNC: 0.28 MG/L FEU (ref 0–0.65)
DIFFERENTIAL METHOD BLD: NORMAL
EOSINOPHIL # BLD: 0 K/UL (ref 0–0.4)
EOSINOPHIL NFR BLD: 0 % (ref 0–7)
ERYTHROCYTE [DISTWIDTH] IN BLOOD BY AUTOMATED COUNT: 13.2 % (ref 11.5–14.5)
EST. AVERAGE GLUCOSE BLD GHB EST-MCNC: 134 MG/DL
FERRITIN SERPL-MCNC: 1775 NG/ML (ref 26–388)
GLOBULIN SER CALC-MCNC: 4.3 G/DL (ref 2–4)
GLUCOSE BLD STRIP.AUTO-MCNC: 126 MG/DL (ref 65–117)
GLUCOSE BLD STRIP.AUTO-MCNC: 138 MG/DL (ref 65–117)
GLUCOSE BLD STRIP.AUTO-MCNC: 152 MG/DL (ref 65–117)
GLUCOSE BLD STRIP.AUTO-MCNC: 158 MG/DL (ref 65–117)
GLUCOSE SERPL-MCNC: 165 MG/DL (ref 65–100)
HBA1C MFR BLD: 6.3 % (ref 4–5.6)
HCT VFR BLD AUTO: 46.7 % (ref 36.6–50.3)
HGB BLD-MCNC: 15.2 G/DL (ref 12.1–17)
IMM GRANULOCYTES # BLD AUTO: 0 K/UL (ref 0–0.04)
IMM GRANULOCYTES NFR BLD AUTO: 0 % (ref 0–0.5)
LDH SERPL L TO P-CCNC: 251 U/L (ref 85–241)
LYMPHOCYTES # BLD: 1.2 K/UL (ref 0.8–3.5)
LYMPHOCYTES NFR BLD: 17 % (ref 12–49)
MCH RBC QN AUTO: 29.2 PG (ref 26–34)
MCHC RBC AUTO-ENTMCNC: 32.5 G/DL (ref 30–36.5)
MCV RBC AUTO: 89.6 FL (ref 80–99)
MONOCYTES # BLD: 0.5 K/UL (ref 0–1)
MONOCYTES NFR BLD: 7 % (ref 5–13)
MYELOCYTES NFR BLD MANUAL: 1 %
NEUTS SEG # BLD: 5.5 K/UL (ref 1.8–8)
NEUTS SEG NFR BLD: 75 % (ref 32–75)
NRBC # BLD: 0 K/UL (ref 0–0.01)
NRBC BLD-RTO: 0 PER 100 WBC
PLATELET # BLD AUTO: 221 K/UL (ref 150–400)
PMV BLD AUTO: 10.2 FL (ref 8.9–12.9)
POTASSIUM SERPL-SCNC: 3.7 MMOL/L (ref 3.5–5.1)
PROT SERPL-MCNC: 7.2 G/DL (ref 6.4–8.2)
RBC # BLD AUTO: 5.21 M/UL (ref 4.1–5.7)
RBC MORPH BLD: NORMAL
SERVICE CMNT-IMP: ABNORMAL
SODIUM SERPL-SCNC: 138 MMOL/L (ref 136–145)
WBC # BLD AUTO: 7.3 K/UL (ref 4.1–11.1)
WBC MORPH BLD: NORMAL

## 2021-08-14 PROCEDURE — 80053 COMPREHEN METABOLIC PANEL: CPT

## 2021-08-14 PROCEDURE — 94640 AIRWAY INHALATION TREATMENT: CPT

## 2021-08-14 PROCEDURE — 86140 C-REACTIVE PROTEIN: CPT

## 2021-08-14 PROCEDURE — 74011000250 HC RX REV CODE- 250: Performed by: INTERNAL MEDICINE

## 2021-08-14 PROCEDURE — 83036 HEMOGLOBIN GLYCOSYLATED A1C: CPT

## 2021-08-14 PROCEDURE — 82728 ASSAY OF FERRITIN: CPT

## 2021-08-14 PROCEDURE — 65660000000 HC RM CCU STEPDOWN

## 2021-08-14 PROCEDURE — 77010033678 HC OXYGEN DAILY

## 2021-08-14 PROCEDURE — 74011000258 HC RX REV CODE- 258: Performed by: INTERNAL MEDICINE

## 2021-08-14 PROCEDURE — 94761 N-INVAS EAR/PLS OXIMETRY MLT: CPT

## 2021-08-14 PROCEDURE — 74011636637 HC RX REV CODE- 636/637: Performed by: INTERNAL MEDICINE

## 2021-08-14 PROCEDURE — 74011250637 HC RX REV CODE- 250/637: Performed by: INTERNAL MEDICINE

## 2021-08-14 PROCEDURE — 74011250636 HC RX REV CODE- 250/636: Performed by: INTERNAL MEDICINE

## 2021-08-14 PROCEDURE — 85025 COMPLETE CBC W/AUTO DIFF WBC: CPT

## 2021-08-14 PROCEDURE — 85379 FIBRIN DEGRADATION QUANT: CPT

## 2021-08-14 PROCEDURE — 36415 COLL VENOUS BLD VENIPUNCTURE: CPT

## 2021-08-14 PROCEDURE — 82962 GLUCOSE BLOOD TEST: CPT

## 2021-08-14 PROCEDURE — 83615 LACTATE (LD) (LDH) ENZYME: CPT

## 2021-08-14 RX ADMIN — ENOXAPARIN SODIUM 40 MG: 40 INJECTION SUBCUTANEOUS at 08:56

## 2021-08-14 RX ADMIN — INSULIN LISPRO 2 UNITS: 100 INJECTION, SOLUTION INTRAVENOUS; SUBCUTANEOUS at 12:37

## 2021-08-14 RX ADMIN — REMDESIVIR 100 MG: 100 INJECTION, POWDER, LYOPHILIZED, FOR SOLUTION INTRAVENOUS at 10:01

## 2021-08-14 RX ADMIN — Medication 10 ML: at 21:15

## 2021-08-14 RX ADMIN — ALBUTEROL SULFATE 2 PUFF: 90 AEROSOL, METERED RESPIRATORY (INHALATION) at 14:20

## 2021-08-14 RX ADMIN — Medication 10 ML: at 14:00

## 2021-08-14 RX ADMIN — ALBUTEROL SULFATE 2 PUFF: 90 AEROSOL, METERED RESPIRATORY (INHALATION) at 20:05

## 2021-08-14 RX ADMIN — Medication 10 ML: at 05:13

## 2021-08-14 RX ADMIN — ALBUTEROL SULFATE 2 PUFF: 90 AEROSOL, METERED RESPIRATORY (INHALATION) at 07:39

## 2021-08-14 RX ADMIN — DEXAMETHASONE SODIUM PHOSPHATE 6 MG: 4 INJECTION, SOLUTION INTRAMUSCULAR; INTRAVENOUS at 21:14

## 2021-08-14 NOTE — PROGRESS NOTES
Hospitalist Progress Note    NAME: Handy Bachelor   :  1955   MRN:  [id number]     Admit date: 2021    Today's date: 21    PCP: MD Darin Fisher M.D. Cell [phone]    Anticipated discharge date: 8/15    Barriers:      Assessment / Plan:    Covid 19 Pneumonia POA  Acute respiratory failure with hypoxia POA  Sepsis POA RR 27,   - Several days of cough and SOB  - CXR Patchy peripheral airspace disease throughout both lungs, left              greater than right, consistent with COVID 19 pneumonia. - Baseline not on home O2, required 2 liters  - Day 3 decadron, remdesivir. Inflammatory markers improving ferritin 2188 --> 1775, CRP 5.84 --> 2.37  -Cont O2 by nasal cannula 2 liters  - Procalcitonin level < 0.05     Stop ceftriaxone and Zithromax   - Ambulate  - Lovenox  - Clinically improved, weaned to RA, sats 89%, now on 1 liter  - Possible discharge in AM, O2 challenge in AM    Pre diabetes HgBa1c 6.3  Hyperglycemia on steroids POA   - , 138, 158, 126  - SSI  - HgBa1c 6.3     Hypokalemia  -replaced and improved    Overweight POA Body mass index is 29.01 kg/m². Code Status: full   Surrogate Decision Maker: wife luciana     DVT Prophylaxis: Lovenox  GI Prophylaxis: not indicated     Baseline: From home independent     Body mass index is 29.01 kg/m². Subjective:     Chief Complaint / Reason for Physician Visit f/u COVID-19 pneumonia  \"My breathing feels better\". Discussed with RN events overnight.    Weaned to RA, sats dropped to 89%, now on 1 liter  Clinically feels better  Inflammatory makers improving  Still with cough  No N/V or diarrhea    Review of Systems:  Symptom Y/N Comments  Symptom Y/N Comments   Fever/Chills n   Chest Pain n    Poor Appetite    Edema     Cough y   Abdominal Pain n    Sputum    Joint Pain     SOB/SMITH y   Headache     Nausea/vomit n   Tolerating PT/OT     Diarrhea n   Tolerating Diet y    Constipation Other       Could NOT obtain due to:      Objective:     VITALS:   Last 24hrs VS reviewed since prior progress note. Most recent are:  Patient Vitals for the past 24 hrs:   Temp Pulse Resp BP SpO2   08/14/21 1533 98 °F (36.7 °C) 81 18 111/61 92 %   08/14/21 1420 -- -- -- -- 93 %   08/14/21 1115 -- 78 -- (!) 158/75 --   08/14/21 1108 97.8 °F (36.6 °C) 75 18 (!) 165/77 93 %   08/14/21 1041 -- -- -- -- 92 %   08/14/21 0739 -- -- -- -- 95 %   08/14/21 0725 97.8 °F (36.6 °C) 72 18 (!) 155/76 94 %   08/14/21 0446 98.4 °F (36.9 °C) 68 18 (!) 140/68 95 %   08/13/21 2052 98.2 °F (36.8 °C) 85 18 (!) 140/65 93 %   08/13/21 2020 -- -- -- -- 94 %       Intake/Output Summary (Last 24 hours) at 8/14/2021 1648  Last data filed at 8/14/2021 1557  Gross per 24 hour   Intake 600 ml   Output --   Net 600 ml        Wt Readings from Last 12 Encounters:   08/12/21 102.5 kg (225 lb 15.5 oz)       PHYSICAL EXAM:    I had a face to face encounter and independently examined this patient on 08/14/21 as outlined below:    General: WD, WN. Alert, cooperative, no acute distress    EENT:  PERRL. Anicteric sclerae. MMM  Resp:  Crackles at bases bilaterally, no wheezing. No accessory muscle use  CV:  Regular  rhythm,  No edema  GI:  Soft, Non distended, Non tender. +Bowel sounds, no rebound  Neurologic:  Alert and oriented X 3, normal speech, non focal motor exam  Psych:   Good insight. Not anxious nor agitated  Skin:  No rashes.   No jaundice    Reviewed most current lab test results and cultures  YES  Reviewed most current radiology test results   YES  Review and summation of old records today    NO  Reviewed patient's current orders and MAR    YES  PMH/SH reviewed - no change compared to H&P  ________________________________________________________________________  Care Plan discussed with:    Comments   Patient x    Family  x Called wife, left message   RN x    Care Manager     Consultant                        Multidiciplinary team rounds were held today with , nursing, pharmacist and clinical coordinator. Patient's plan of care was discussed; medications were reviewed and discharge planning was addressed. ________________________________________________________________________      Comments   >50% of visit spent in counseling and coordination of care     ________________________________________________________________________  Sarina Dancer, MD     Procedures: see electronic medical records for all procedures/Xrays and details which were not copied into this note but were reviewed prior to creation of Plan. LABS:  I reviewed today's most current labs and imaging studies.   Pertinent labs include:  Recent Labs     08/14/21  0501 08/13/21  0549 08/12/21  1517   WBC 7.3 3.1* 6.2   HGB 15.2 16.0 17.2*   HCT 46.7 47.1 52.0*    185 186     Recent Labs     08/14/21  0501 08/13/21  0549 08/12/21  1517    138 134*   K 3.7 3.7 3.3*    104 99   CO2 25 27 28   * 188* 138*   BUN 19 15 11   CREA 0.92 1.08 1.22   CA 8.7 8.9 9.0   ALB 2.9* 3.0* 3.5   TBILI 0.6 0.9 1.0   ALT 51 46 54

## 2021-08-14 NOTE — PROGRESS NOTES
End of Shift Note    Bedside shift change report given to Breana LASSITER (oncoming nurse) by Calvin Bronson RN (offgoing nurse). Report included the following information SBAR, Kardex, Intake/Output, MAR, Accordion, Recent Results and Med Rec Status    Shift worked:  7PM-7AM     Shift summary and any significant changes:     Pt rested well.       Concerns for physician to address:       Zone phone for oncoming shift:          Calvin Bronson RN

## 2021-08-14 NOTE — ROUTINE PROCESS
Bedside shift change report given to [address] [place] [address] (oncoming nurse) by Micky EWING RN (offgoing nurse). Report included the following information SBAR, Kardex and MAR.

## 2021-08-15 VITALS
HEIGHT: 74 IN | WEIGHT: 225.97 LBS | HEART RATE: 66 BPM | DIASTOLIC BLOOD PRESSURE: 63 MMHG | SYSTOLIC BLOOD PRESSURE: 129 MMHG | RESPIRATION RATE: 16 BRPM | TEMPERATURE: 97.9 F | OXYGEN SATURATION: 97 % | BODY MASS INDEX: 29 KG/M2

## 2021-08-15 LAB
ALBUMIN SERPL-MCNC: 3 G/DL (ref 3.5–5)
ALBUMIN/GLOB SERPL: 0.7 {RATIO} (ref 1.1–2.2)
ALP SERPL-CCNC: 45 U/L (ref 45–117)
ALT SERPL-CCNC: 59 U/L (ref 12–78)
ANION GAP SERPL CALC-SCNC: 6 MMOL/L (ref 5–15)
AST SERPL-CCNC: 45 U/L (ref 15–37)
BILIRUB SERPL-MCNC: 0.9 MG/DL (ref 0.2–1)
BUN SERPL-MCNC: 19 MG/DL (ref 6–20)
BUN/CREAT SERPL: 21 (ref 12–20)
CALCIUM SERPL-MCNC: 8.7 MG/DL (ref 8.5–10.1)
CHLORIDE SERPL-SCNC: 107 MMOL/L (ref 97–108)
CO2 SERPL-SCNC: 26 MMOL/L (ref 21–32)
CREAT SERPL-MCNC: 0.92 MG/DL (ref 0.7–1.3)
CRP SERPL-MCNC: 1.13 MG/DL (ref 0–0.6)
D DIMER PPP FEU-MCNC: 0.22 MG/L FEU (ref 0–0.65)
FERRITIN SERPL-MCNC: 1623 NG/ML (ref 26–388)
GLOBULIN SER CALC-MCNC: 4.1 G/DL (ref 2–4)
GLUCOSE BLD STRIP.AUTO-MCNC: 158 MG/DL (ref 65–117)
GLUCOSE BLD STRIP.AUTO-MCNC: 188 MG/DL (ref 65–117)
GLUCOSE SERPL-MCNC: 179 MG/DL (ref 65–100)
POTASSIUM SERPL-SCNC: 4 MMOL/L (ref 3.5–5.1)
PROT SERPL-MCNC: 7.1 G/DL (ref 6.4–8.2)
SERVICE CMNT-IMP: ABNORMAL
SERVICE CMNT-IMP: ABNORMAL
SODIUM SERPL-SCNC: 139 MMOL/L (ref 136–145)

## 2021-08-15 PROCEDURE — 77010033678 HC OXYGEN DAILY

## 2021-08-15 PROCEDURE — 74011000258 HC RX REV CODE- 258: Performed by: INTERNAL MEDICINE

## 2021-08-15 PROCEDURE — 82962 GLUCOSE BLOOD TEST: CPT

## 2021-08-15 PROCEDURE — 36415 COLL VENOUS BLD VENIPUNCTURE: CPT

## 2021-08-15 PROCEDURE — 74011250636 HC RX REV CODE- 250/636: Performed by: INTERNAL MEDICINE

## 2021-08-15 PROCEDURE — 74011636637 HC RX REV CODE- 636/637: Performed by: INTERNAL MEDICINE

## 2021-08-15 PROCEDURE — 74011000250 HC RX REV CODE- 250: Performed by: INTERNAL MEDICINE

## 2021-08-15 PROCEDURE — 94761 N-INVAS EAR/PLS OXIMETRY MLT: CPT

## 2021-08-15 PROCEDURE — 94640 AIRWAY INHALATION TREATMENT: CPT

## 2021-08-15 PROCEDURE — 82728 ASSAY OF FERRITIN: CPT

## 2021-08-15 PROCEDURE — 80053 COMPREHEN METABOLIC PANEL: CPT

## 2021-08-15 PROCEDURE — 74011250637 HC RX REV CODE- 250/637: Performed by: INTERNAL MEDICINE

## 2021-08-15 PROCEDURE — 94762 N-INVAS EAR/PLS OXIMTRY CONT: CPT

## 2021-08-15 PROCEDURE — 86140 C-REACTIVE PROTEIN: CPT

## 2021-08-15 PROCEDURE — 85379 FIBRIN DEGRADATION QUANT: CPT

## 2021-08-15 RX ORDER — DEXAMETHASONE 6 MG/1
6 TABLET ORAL EVERY EVENING
Qty: 4 TABLET | Refills: 0 | Status: SHIPPED | OUTPATIENT
Start: 2021-08-15 | End: 2021-08-19

## 2021-08-15 RX ADMIN — ALBUTEROL SULFATE 2 PUFF: 90 AEROSOL, METERED RESPIRATORY (INHALATION) at 14:00

## 2021-08-15 RX ADMIN — ALBUTEROL SULFATE 2 PUFF: 90 AEROSOL, METERED RESPIRATORY (INHALATION) at 02:07

## 2021-08-15 RX ADMIN — Medication 10 ML: at 05:04

## 2021-08-15 RX ADMIN — INSULIN LISPRO 2 UNITS: 100 INJECTION, SOLUTION INTRAVENOUS; SUBCUTANEOUS at 13:30

## 2021-08-15 RX ADMIN — ALBUTEROL SULFATE 2 PUFF: 90 AEROSOL, METERED RESPIRATORY (INHALATION) at 08:00

## 2021-08-15 RX ADMIN — Medication 10 ML: at 13:31

## 2021-08-15 RX ADMIN — INSULIN LISPRO 2 UNITS: 100 INJECTION, SOLUTION INTRAVENOUS; SUBCUTANEOUS at 09:27

## 2021-08-15 RX ADMIN — REMDESIVIR 100 MG: 100 INJECTION, POWDER, LYOPHILIZED, FOR SOLUTION INTRAVENOUS at 09:26

## 2021-08-15 RX ADMIN — ENOXAPARIN SODIUM 40 MG: 40 INJECTION SUBCUTANEOUS at 09:36

## 2021-08-15 NOTE — DISCHARGE SUMMARY
Hospitalist Discharge  Note    NAME: Ruchi Bravo   :  1955   MRN:  [id number]     Admit date: 2021    Discharge date: 08/15/21    PCP: Jaymie Stoddard MD    Discharge Diagnoses:    Covid-19 Pneumonia POA    Acute respiratory failure with hypoxia POA    Sepsis POA RR 27,     Pre diabetes HgBa1c 6.3    Hyperglycemia on steroids POA      Hypokalemia    Overweight POA Body mass index is 29.01 kg/m². Code Status: full       Discharge Medications:  Current Discharge Medication List      START taking these medications    Details   dexAMETHasone (Decadron) 6 mg tablet Take 1 Tablet by mouth every evening for 4 doses. Qty: 4 Tablet, Refills: 0             Follow-up Information     Follow up With Specialties Details Why Contact Info    Jaymie Stoddard MD Family Medicine Schedule an appointment as soon as possible for a visit in 1 week follow up COVID-19 pneumonia [address] [facility] [id number] [phone]            Time spent on discharge:   I spent greater than 30 minutes on discharge, seeing and examining the patient, reconciling home meds and new meds, coordinating care with case management, doing the discharge papers and the D/C summary    Discharge disposition: home    Discharge Condition: Stable    Summary of admission H+P(copied from Dr Vimal Medrano  Note):     CHIEF COMPLAINT: Sob     HISTORY OF PRESENT ILLNESS:     Ruchi Bravo is a 78 y.o.  male who presents with sob and cough since last 3 days. Sob is worse with minimal exertion with cough but no phlegm. He also reports having fever of 101. Does not report any chest pain. No abdominal pain N/V.  On arrival to ed he was hypoxic and required 2L nasal cannula.     We were asked to admit for work up and evaluation of the above problems.      Pmh  None     No past surgical history on file.     Social History           Tobacco Use    Smoking status: Not on file   Substance Use Topics    Alcohol use: Not on file     Admit pCXR read by radiology FINDINGS:   Single AP portable view of the chest obtained at 1803 demonstrates a  normal cardiomediastinal silhouette. The lungs are hypoinspiratory. There is  patchy airspace disease throughout the left lung. Similar findings on the right,  less severe. No osseous abnormalities are seen. IMPRESSION  Patchy peripheral airspace disease throughout both lungs, left  greater than right, consistent with COVID 19 pneumonia. Hospital course:       Covid 23 Pneumonia POA  Acute respiratory failure with hypoxia POA  Sepsis POA RR 27,   - Several days of cough and SOB  - CXR Patchy peripheral airspace disease throughout both lungs, left              greater than right, consistent with COVID 19 pneumonia. - Baseline not on home O2, required 2 liters  - Day 4 decadron, remdesivir. Inflammatory markers improving    D-dimer 0.36 --> 0.28 --> 0.22    Ferritin 2188 --> 1775 --> 1623    CRP 5.84 --> 2.37 --> 1.13  -Cont O2 by nasal cannula 2 liters  - Procalcitonin level < 0.05     Stop ceftriaxone and Zithromax   - Ambulate  - Lovenox  - Clinically improved, weaned to RA by 8/15  - O2 challenge on RA and 2 liters on day of discharge   Sa02 92 % on room air AT REST. Sa02 93 % on room air DURING AMBULATION.      Sa02 94 % on 2 Liters AT REST   Sa02 96% on 2 Liters DURING AMBULATION. Sa02 93 % on room air AT REST/AFTER AMBULATION.  - clinically much improved, not SOB walking on RA, defeels ready to go home  - Discharge to home    Pre diabetes HgBa1c 6.3  Hyperglycemia on steroids POA   - , 138, 158, 126, 152, 158  - SSI  - HgBa1c 6.3, already lost > 20 lbs changing diet  - PCP follow up to further manage     Hypokalemia  -replaced and improved    Overweight POA Body mass index is 29.01 kg/m².     Code Status: full   Surrogate Decision Maker: wife luciana     DVT Prophylaxis: Lovenox  GI Prophylaxis: not indicated     Baseline: From home independent     Body mass index is 29.01 kg/m². Subjective:     Chief Complaint / Reason for Physician Visit f/u COVID-19 pneumonia  \"My breathing feels better\". Discussed with RN events overnight. Weaned to RA, sats low 90s at rest, clinically feels better  Hoping to go home soon    Review of Systems:  Symptom Y/N Comments  Symptom Y/N Comments   Fever/Chills n   Chest Pain n    Poor Appetite    Edema     Cough y   Abdominal Pain n    Sputum    Joint Pain     SOB/SMITH n   Headache     Nausea/vomit n   Tolerating PT/OT     Diarrhea n   Tolerating Diet y    Constipation    Other       Could NOT obtain due to:      Objective:     VITALS:   Last 24hrs VS reviewed since prior progress note. Most recent are:  Patient Vitals for the past 24 hrs:   Temp Pulse Resp BP SpO2   08/15/21 1436 -- -- -- -- 97 %   08/15/21 1253 97.9 °F (36.6 °C) 66 16 129/63 93 %   08/15/21 0810 -- -- -- -- 94 %   08/15/21 0803 97.9 °F (36.6 °C) 70 16 (!) 143/77 93 %   08/15/21 0340 97.7 °F (36.5 °C) 76 18 123/70 94 %   08/15/21 0207 -- -- -- -- 94 %   08/14/21 2305 97.9 °F (36.6 °C) 66 18 (!) 122/59 93 %   08/14/21 2005 -- -- -- -- 94 %   08/14/21 1916 98.5 °F (36.9 °C) 79 18 132/64 90 %   08/14/21 1743 -- -- -- -- 92 %   08/14/21 1533 98 °F (36.7 °C) 81 18 111/61 92 %       Intake/Output Summary (Last 24 hours) at 8/15/2021 1525  Last data filed at 8/14/2021 1557  Gross per 24 hour   Intake 600 ml   Output --   Net 600 ml        Wt Readings from Last 12 Encounters:   08/12/21 102.5 kg (225 lb 15.5 oz)       PHYSICAL EXAM:    I had a face to face encounter and independently examined this patient on 08/15/21 as outlined below:    General: WD, WN. Alert, cooperative, no acute distress    Resp:  Crackles at bases bilaterally, no wheezing. No accessory muscle use  CV:  Regular  rhythm,  No edema  Neurologic:  Alert and oriented X 3, normal speech, non focal motor exam  Psych:   Good insight. Not anxious nor agitated  Skin:  No rashes.   No jaundice    Reviewed most current lab test results and cultures  YES  Reviewed most current radiology test results   YES  Review and summation of old records today    NO  Reviewed patient's current orders and MAR    YES  PMH/SH reviewed - no change compared to H&P  ________________________________________________________________________  Care Plan discussed with:    Comments   Patient x    Family      RN x    Care Manager     Consultant                        Multidiciplinary team rounds were held today with , nursing, pharmacist and clinical coordinator. Patient's plan of care was discussed; medications were reviewed and discharge planning was addressed. ________________________________________________________________________      Comments   >50% of visit spent in counseling and coordination of care     ________________________________________________________________________  Jacklin Mcburney, MD     Procedures: see electronic medical records for all procedures/Xrays and details which were not copied into this note but were reviewed prior to creation of Plan. LABS:  I reviewed today's most current labs and imaging studies.   Pertinent labs include:  Recent Labs     08/14/21  0501 08/13/21  0549   WBC 7.3 3.1*   HGB 15.2 16.0   HCT 46.7 47.1    185     Recent Labs     08/15/21  0322 08/14/21  0501 08/13/21  0549    138 138   K 4.0 3.7 3.7    106 104   CO2 26 25 27   * 165* 188*   BUN 19 19 15   CREA 0.92 0.92 1.08   CA 8.7 8.7 8.9   ALB 3.0* 2.9* 3.0*   TBILI 0.9 0.6 0.9   ALT 59 51 46

## 2021-08-15 NOTE — PROGRESS NOTES
Transition of Care Plan:    RUR: 8%  Disposition: Home with wife  Follow up appointments: PCP  DME needed: None  Transportation at Discharge: Wife  [address] or means to access home:  Yes      IM Medicare Letter: N/A - Pt reports to have private TRELYS only. No Medicare. Is patient a BCPI-A Bundle:  No         If yes, was Bundle Letter given?:   N/A  Caregiver Contact: Wife, America Denis ([phone])  Discharge Caregiver contacted prior to discharge? Not indicated, pt is in contact with wife. Reason for Admission:  Pneumonia due to COVID-19                   RUR Score:  8%                   Plan for utilizing home health:  No HH needs identified at this time. PCP: First and Last name:  Neeraj Mejia MD   Name of Practice: [address] [place] [address]   Are you a current patient: Yes/No: Yes   Approximate date of last visit: 2 years ago   Can you participate in a virtual visit with your PCP: Yes                    Current Advanced Directive/Advance Care Plan: Full Code. Reports to have AD at home. CM encouraged pt to provide copy to hospital and PCP. Healthcare Decision Maker:   Primary Decision Maker: Juan Miguel Johnson - Spouse - [phone]                  Transition of Care Plan:  Home with wife    CM spoke with patient via bedside telephone to complete initial assessment due to droplet isolation precautions. CM introduced role, verified demographics, and discussed discharge planning. Pt is a 79 yo male admitted to [id number] [place] [address] for \"Pneumonia due to COVID-19\". Pt reports to be independent with ADL/IADLs to include driving and is ambulatory without DME use. Pt is employed full-time and currently working from home. Pt and wife reside together in a single story home with ramp access. Pt is listed as having Medicare Part A, however, pt reports to only have private insurance through [facility]. No reported hx of HH, SNF, or IPR.  Pt will discharge home today without needs other than PCP follow-up. Patient verbalized understanding of the plan. No further concerns indicated at this time. AVS updated. Patient is ready for discharge from a Care Management standpoint. RN informed. Care Management Interventions  PCP Verified by CM: Yes (Dr. Manuela Saldaña)  Last Visit to PCP: 08/05/19  Mode of Transport at Discharge: Other (see comment) (wife)  Transition of Care Consult (CM Consult):  Other (home with wife)  Discharge Durable Medical Equipment: No  Physical Therapy Consult: No  Occupational Therapy Consult: No  Speech Therapy Consult: No  Current Support Network: Lives with Spouse, Own Home  Confirm Follow Up Transport: Self  Discharge Location  Discharge Placement: Home with family assistance    Melva Elliott [facility]

## 2021-08-15 NOTE — PROGRESS NOTES
Home Oxygen Test  Date of test: 08/15  Time of test: 1035    Sa02 92 % on room air AT REST. Sa02 93 % on room air DURING AMBULATION. Sa02 94 % on 2 Liters AT REST  Sa02 96% on 2 Liters DURING AMBULATION. Sa02 93 % on room air AT REST/AFTER AMBULATION.

## 2021-08-15 NOTE — PROGRESS NOTES
Hospitalist Progress Note    NAME: Vargas Reed   :  1955   MRN:  [id number]     Admit date: 2021    Today's date: 08/15/21    PCP: MD Arlette Cuadra M.D. Cell [phone]    Anticipated discharge date: 8/15 or     Barriers:  Needs O2 challenge walking and possible home o2 set up    Assessment / Plan:    Covid 19 Pneumonia POA  Acute respiratory failure with hypoxia POA  Sepsis POA RR 27,   - Several days of cough and SOB  - CXR Patchy peripheral airspace disease throughout both lungs, left              greater than right, consistent with COVID 19 pneumonia. - Baseline not on home O2, required 2 liters  - Day4 decadron, remdesivir. Inflammatory markers improving ferritin 2188 --> 1775 --> , CRP 5.84 --> 2.37  -Cont O2 by nasal cannula 2 liters  - Procalcitonin level < 0.05     Stop ceftriaxone and Zithromax   - Ambulate  - Lovenox  - Clinically improved, weaned to RA, sats at 90%, not SOB  - O2 challenge on RA and 2 liters, set up home o2 if needed  - Discharge once issue of home O2 settled    Pre diabetes HgBa1c 6.3  Hyperglycemia on steroids POA   - , 138, 158, 126, 152, 158  - SSI  - HgBa1c 6.3, already lost > 20 lbs changing diet  - PCP follow up to further manage     Hypokalemia  -replaced and improved    Overweight POA Body mass index is 29.01 kg/m². Code Status: full   Surrogate Decision Maker: wife luciana     DVT Prophylaxis: Lovenox  GI Prophylaxis: not indicated     Baseline: From home independent     Body mass index is 29.01 kg/m². Subjective:     Chief Complaint / Reason for Physician Visit f/u COVID-19 pneumonia  \"My breathing feels better\". Discussed with RN events overnight.    Weaned to RA, sats low 90s at rest, clinically feels better  Hoping to go home soon    Review of Systems:  Symptom Y/N Comments  Symptom Y/N Comments   Fever/Chills n   Chest Pain n    Poor Appetite    Edema     Cough y   Abdominal Pain n Sputum    Joint Pain     SOB/SMITH n   Headache     Nausea/vomit n   Tolerating PT/OT     Diarrhea n   Tolerating Diet y    Constipation    Other       Could NOT obtain due to:      Objective:     VITALS:   Last 24hrs VS reviewed since prior progress note. Most recent are:  Patient Vitals for the past 24 hrs:   Temp Pulse Resp BP SpO2   08/15/21 0803 97.9 °F (36.6 °C) 70 16 (!) 143/77 93 %   08/15/21 0340 97.7 °F (36.5 °C) 76 18 123/70 94 %   08/15/21 0207 -- -- -- -- 94 %   08/14/21 2305 97.9 °F (36.6 °C) 66 18 (!) 122/59 93 %   08/14/21 2005 -- -- -- -- 94 %   08/14/21 1916 98.5 °F (36.9 °C) 79 18 132/64 90 %   08/14/21 1743 -- -- -- -- 92 %   08/14/21 1533 98 °F (36.7 °C) 81 18 111/61 92 %   08/14/21 1420 -- -- -- -- 93 %   08/14/21 1115 -- 78 -- (!) 158/75 --   08/14/21 1108 97.8 °F (36.6 °C) 75 18 (!) 165/77 93 %   08/14/21 1041 -- -- -- -- 92 %       Intake/Output Summary (Last 24 hours) at 8/15/2021 0959  Last data filed at 8/14/2021 1557  Gross per 24 hour   Intake 600 ml   Output --   Net 600 ml        Wt Readings from Last 12 Encounters:   08/12/21 102.5 kg (225 lb 15.5 oz)       PHYSICAL EXAM:    I had a face to face encounter and independently examined this patient on 08/15/21 as outlined below:    General: WD, WN. Alert, cooperative, no acute distress    Resp:  Crackles at bases bilaterally, no wheezing. No accessory muscle use  CV:  Regular  rhythm,  No edema  Neurologic:  Alert and oriented X 3, normal speech, non focal motor exam  Psych:   Good insight. Not anxious nor agitated  Skin:  No rashes.   No jaundice    Reviewed most current lab test results and cultures  YES  Reviewed most current radiology test results   YES  Review and summation of old records today    NO  Reviewed patient's current orders and MAR    YES  PMH/ reviewed - no change compared to H&P  ________________________________________________________________________  Care Plan discussed with:    Comments   Patient x    Family RN x    Care Manager     Consultant                        Multidiciplinary team rounds were held today with , nursing, pharmacist and clinical coordinator. Patient's plan of care was discussed; medications were reviewed and discharge planning was addressed. ________________________________________________________________________      Comments   >50% of visit spent in counseling and coordination of care     ________________________________________________________________________  Oneyda Kidd MD     Procedures: see electronic medical records for all procedures/Xrays and details which were not copied into this note but were reviewed prior to creation of Plan. LABS:  I reviewed today's most current labs and imaging studies.   Pertinent labs include:  Recent Labs     08/14/21  0501 08/13/21  0549 08/12/21  1517   WBC 7.3 3.1* 6.2   HGB 15.2 16.0 17.2*   HCT 46.7 47.1 52.0*    185 186     Recent Labs     08/15/21  0322 08/14/21  0501 08/13/21  0549    138 138   K 4.0 3.7 3.7    106 104   CO2 26 25 27   * 165* 188*   BUN 19 19 15   CREA 0.92 0.92 1.08   CA 8.7 8.7 8.9   ALB 3.0* 2.9* 3.0*   TBILI 0.9 0.6 0.9   ALT 59 51 46

## 2021-08-15 NOTE — PROGRESS NOTES
Bedside and Verbal shift change report given to Breana RN (oncoming nurse) by Jose Barrett RN (offgoing nurse). Report included the following information SBAR, Kardex, Intake/Output, MAR, Accordion, Recent Results and Med Rec Status.

## 2021-08-15 NOTE — DISCHARGE INSTRUCTIONS
Patient Discharge Instructions    Huber Neff / [id number] : 1955    Admitted 2021 Discharged: 8/15/2021         DISCHARGE DIAGNOSIS:   Active Problems:    Pneumonia due to COVID-19 virus (2021)     Pre diabetes HgB a1c 6.3 d/w your PCP at next visit      What to do at Home    1. Recommended diet: Cardiac Diet    2. Recommended activity: Activity as tolerated, avoid strenuous activity until fully recovered    3. If you experience any of the following symptoms then please call your primary care physician or return to the emergency room if you cannot get hold of your doctor:   Fevers > 100.5, chills   Nausea or vomiting, persistent diarrhea > 24 hours   Blood in stool or black stools   Chest pain or SOB      Follow-up Information     Follow up With Specialties Details Why Contact Info    Jennifer Polk MD Family Medicine Schedule an appointment as soon as possible for a visit in 1 week follow up COVID-19 pneumonia [address] [facility] [address]  [phone]          Complete decadron as directed for several more days    You should expect continued gradual improvement over next 1-2 weeks, any new fevers or persistently worsening SOB, please contact your PCP or return to the emergency room ASAP    Need to isolate for 20 days after onset of symptoms    1. Wear masks when in contact with others, they should wear masks as well    2. Stay 6 feet apart as much as possible    3. Sleep in separate bedroom from other family or housemates    4. No social contacts or public events, no shopping    5. Wash hands with soap and water for 20 sec frequently    6. Cover mouth and nose for coughing and sneezing    After 20 days completed, continue social distancing per state guidelines  1. Maintain 6 feet apart from others  2. Wear mask in public at all times  3. Wash hands frequently  4.  Cover mouth and nose when coughing and sneezing      Information obtained by :  I understand that if any problems occur once I am at home I am to contact my physician. I understand and acknowledge receipt of the instructions indicated above.                                                                                                                                            Physician's or R.N.'s Signature                                                                  Date/Time                                                                                                                                              Patient or Representative Signature                                                          Date/Time

## 2021-08-16 ENCOUNTER — PATIENT OUTREACH (OUTPATIENT)
Dept: CASE MANAGEMENT | Age: 66
End: 2021-08-16

## 2021-08-16 NOTE — LETTER
8/17/2021 12:54 PM    Mr. Jesus Olivarez  [address] [facility] [address] [id number]    Dear Jesus Olivarez    My name is Di Rueda and I am a Care Transition Nurse who partners with Dr Celi Kebede to improve patients' health. I've been trying to reach you via phone to let you know that, as a member of your care team, I will work with other providers involved in your care, offer education for your specific health conditions, and connect you with more resources as needed. This program is designed to provide you with the opportunity to have a ([address] [facility]/[address]) care manager partner with you for the following situations:     1) if you come home from the hospital or emergency room   2) to help manage your disease   3) when you would like assistance coordinating services or appointments    This added support is provided at no additional cost to you. My primary focus is to help you achieve specific goals and improve your health. Please call me at [phone] to further discuss how I can support your health care needs.     Sincerely,    Di Rueda MSN, RN, [address] [place] [address] Transition Nurse  [phone]

## 2021-08-16 NOTE — PROGRESS NOTES
Care Transitions Outreach Attempt    Call within 2 business days of discharge: Yes   Attempted to reach patient for transitions of care follow up. Unable to reach patient. Patient: Lashell Lott Patient : 1955 MRN: [id number]    Last Discharge [address]       Complaint Diagnosis Description Type Department Provider    21 Positive For Covid-19 Pneumonia due to COVID-19 virus ED to Hosp-Admission (Discharged) (ADMIT) Sara Burns MD; Lennox Flores. .. Was this an external facility discharge? No       Noted following upcoming appointments from discharge chart review:   [facility] follow up appointment(s): No future appointments. Non-[facility] follow up appointment(s): Dr Veronica Varela 21 at 7:30am virtually.

## 2021-08-24 ENCOUNTER — PATIENT OUTREACH (OUTPATIENT)
Dept: CASE MANAGEMENT | Age: 66
End: 2021-08-24

## 2021-08-24 NOTE — PROGRESS NOTES
08/24/21   No response to 2 calls and 1 letter, episode closed.     Sherrie BERRY, RN, CCM / Care Transition Nurse / [phone]

## 2022-03-19 PROBLEM — J12.82 PNEUMONIA DUE TO COVID-19 VIRUS: Status: ACTIVE | Noted: 2021-08-12

## 2022-03-19 PROBLEM — U07.1 PNEUMONIA DUE TO COVID-19 VIRUS: Status: ACTIVE | Noted: 2021-08-12

## 2025-08-07 ENCOUNTER — PROCEDURE VISIT (OUTPATIENT)
Age: 70
End: 2025-08-07
Payer: COMMERCIAL

## 2025-08-07 DIAGNOSIS — M79.2 NEURALGIA: ICD-10-CM

## 2025-08-07 DIAGNOSIS — M79.672 LEFT FOOT PAIN: ICD-10-CM

## 2025-08-07 DIAGNOSIS — R20.0 NUMBNESS AND TINGLING OF BOTH FEET: Primary | ICD-10-CM

## 2025-08-07 DIAGNOSIS — R20.2 NUMBNESS AND TINGLING OF BOTH FEET: Primary | ICD-10-CM

## 2025-08-07 PROCEDURE — 95886 MUSC TEST DONE W/N TEST COMP: CPT | Performed by: PSYCHIATRY & NEUROLOGY

## 2025-08-07 PROCEDURE — 95910 NRV CNDJ TEST 7-8 STUDIES: CPT | Performed by: PSYCHIATRY & NEUROLOGY

## 2025-08-07 PROCEDURE — 95885 MUSC TST DONE W/NERV TST LIM: CPT | Performed by: PSYCHIATRY & NEUROLOGY

## 2025-08-11 ENCOUNTER — TELEPHONE (OUTPATIENT)
Age: 70
End: 2025-08-11